# Patient Record
Sex: MALE | Race: BLACK OR AFRICAN AMERICAN | Employment: OTHER | URBAN - METROPOLITAN AREA
[De-identification: names, ages, dates, MRNs, and addresses within clinical notes are randomized per-mention and may not be internally consistent; named-entity substitution may affect disease eponyms.]

---

## 2017-01-25 RX ORDER — EZETIMIBE 10 MG/1
10 TABLET ORAL DAILY
Qty: 30 TAB | Refills: 4 | Status: SHIPPED | OUTPATIENT
Start: 2017-01-25 | End: 2017-08-08 | Stop reason: SDUPTHER

## 2017-02-08 ENCOUNTER — OFFICE VISIT (OUTPATIENT)
Dept: FAMILY MEDICINE CLINIC | Age: 62
End: 2017-02-08

## 2017-02-08 VITALS
OXYGEN SATURATION: 94 % | SYSTOLIC BLOOD PRESSURE: 144 MMHG | HEART RATE: 81 BPM | WEIGHT: 294.2 LBS | BODY MASS INDEX: 43.58 KG/M2 | HEIGHT: 69 IN | TEMPERATURE: 97.9 F | RESPIRATION RATE: 18 BRPM | DIASTOLIC BLOOD PRESSURE: 88 MMHG

## 2017-02-08 DIAGNOSIS — E66.01 MORBID OBESITY, UNSPECIFIED OBESITY TYPE (HCC): ICD-10-CM

## 2017-02-08 DIAGNOSIS — E11.9 DIABETES MELLITUS WITHOUT COMPLICATION (HCC): Primary | ICD-10-CM

## 2017-02-08 DIAGNOSIS — E78.00 ELEVATED CHOLESTEROL: ICD-10-CM

## 2017-02-08 DIAGNOSIS — I10 ESSENTIAL HYPERTENSION: ICD-10-CM

## 2017-02-08 DIAGNOSIS — K21.9 GASTROESOPHAGEAL REFLUX DISEASE WITHOUT ESOPHAGITIS: ICD-10-CM

## 2017-02-08 NOTE — PROGRESS NOTES
Chief Complaint   Patient presents with    Diabetes    Cholesterol Problem     Zetia    Hypertension     Norvasc     Follow up  -Fasting   -Blood obtained in office

## 2017-02-08 NOTE — LETTER
2/22/2017 8:27 AM 
 
Mr. Fili Shepherd [id number] Dear Olimpia Ley Sr.: 
 
Please find your most recent results below. Resulted Orders METABOLIC PANEL, COMPREHENSIVE Result Value Ref Range Glucose 95 65 - 99 mg/dL BUN 11 8 - 27 mg/dL Creatinine 0.71 (L) 0.76 - 1.27 mg/dL GFR est non- >59 mL/min/1.73 GFR est  >59 mL/min/1.73  
 BUN/Creatinine ratio 15 10 - 22 Sodium 142 134 - 144 mmol/L Potassium 4.1 3.5 - 5.2 mmol/L Chloride 97 96 - 106 mmol/L  
 CO2 32 (H) 18 - 29 mmol/L Calcium 9.3 8.6 - 10.2 mg/dL Protein, total 7.2 6.0 - 8.5 g/dL Albumin 4.4 3.6 - 4.8 g/dL GLOBULIN, TOTAL 2.8 1.5 - 4.5 g/dL A-G Ratio 1.6 1.1 - 2.5 Bilirubin, total 0.5 0.0 - 1.2 mg/dL Alk. phosphatase 88 39 - 117 IU/L  
 AST (SGOT) 27 0 - 40 IU/L  
 ALT (SGPT) 26 0 - 44 IU/L Narrative Performed at:  [address]  [id number] : Mio Ross MD, Phone:  [phone] LIPID PANEL Result Value Ref Range Cholesterol, total 212 (H) 100 - 199 mg/dL Triglyceride 120 0 - 149 mg/dL HDL Cholesterol 60 >39 mg/dL VLDL, calculated 24 5 - 40 mg/dL LDL, calculated 128 (H) 0 - 99 mg/dL Narrative Performed at:  [address]  [id number] : Mio Ross MD, Phone:  [phone] HEMOGLOBIN A1C WITH EAG Result Value Ref Range Hemoglobin A1c 6.4 (H) 4.8 - 5.6 % Comment:  
            Pre-diabetes: 5.7 - 6.4 Diabetes: >6.4 Glycemic control for adults with diabetes: <7.0 Estimated average glucose 137 mg/dL Narrative Performed at:  [address]  [id number] : Mio Ross MD, Phone:  [phone] MICROALBUMIN, UR, RAND W/ MICROALBUMIN/CREA RATIO Result Value Ref Range Creatinine, urine 151.7 Not Estab. mg/dL Microalbumin, urine 38.2 Not Estab. ug/mL Microalb/Creat ratio (ug/mg creat.) 25.2 0.0 - 30.0 mg/g creat Narrative Performed at:  [address]  [id number] : Lisa Godwin MD, Phone:  [phone] CVD REPORT Result Value Ref Range INTERPRETATION Note Comment:  
   Supplement report is available. Narrative Performed at:  [facility] [place] [address]  [id number] : Vadim Nava PhD, Phone:  [phone] DIABETES PATIENT EDUCATION Result Value Ref Range PDF Image Not applicable Narrative Performed at:  [address] [place] [address]  [id number] : Vadim Nava PhD, Phone:  [phone] RECOMMENDATIONS: Stick to strict diabetic diet , exercise regularly and if on medications be compliant with your medications. Keep your follow-up appointment. Please call me if you have any questions: [phone] Sincerely, Dale Brantley MD

## 2017-02-08 NOTE — PROGRESS NOTES
HISTORY OF PRESENT ILLNESS  Rhonda Franklin is a 57 y.o. male. Diabetes   The history is provided by the patient. This is a chronic problem. Progression since onset: DM has been undr good control . Last Hba1C was 6.4 . Compliant with medications . Cholesterol Problem   The history is provided by the patient. This is a chronic problem. Progression since onset: Intolerant to statins and he is on Zetia . The profile is reasonable with mild elevation of LDL . Hypertension   The history is provided by the patient. This is a chronic problem. Progression since onset: BP has been marginal . No symptoms . Compliant with meds . No adverse reactions. GERD   The history is provided by the patient. This is a chronic problem. Progression since onset: No symptoms and he is off any medication . Weight Management   The history is provided by the patient. This is a chronic problem. Progression since onset: He is trying to lose weight and he does walking  every day        Review of Systems   Constitutional: Positive for weight gain. HENT: Negative. Eyes: Negative. Respiratory: Negative. Cardiovascular: Negative. Gastrointestinal: Negative. Genitourinary: Negative. Musculoskeletal: Negative. Skin: Negative. Neurological: Negative. Endo/Heme/Allergies: Negative. Psychiatric/Behavioral: Negative. Physical Exam  General:   Head: Alert, cooperative, no distress, appears stated age. Normocephalic and atraumatic   Eyes:  Conjunctivae/corneas clear. PERRL, EOMs intact. Ears:  Normal TMs and external ear canals both ears. Nose: Nares normal. Septum midline. Mucosa normal. No drainage or sinus tenderness. Mouth:  Throat: Lips, mucosa, and tongue normal. Teeth and gums normal.  No lesions or exudates. Neck: Supple, symmetrical, trachea midline, no adenopathy, thyroid: no enlargement/tenderness/nodules. Back:   Symmetric, no curvature. ROM normal. No CVA tenderness. Lungs:   Clear to auscultation bilaterally. Heart:  Regular rate and rhythm, S1, S2 normal, no murmur, click, rub or gallop. Abdomen:   Soft, non-tender. Bowel sounds normal. No masses,  No organomegaly. Extremities: Extremities normal, atraumatic, no cyanosis or edema. Pulses: 2+ and symmetric all extremities. Skin: Skin color, texture, turgor normal. No rashes or lesions   Lymph nodes: Cervical & supraclavicular nodes normal.   Neurologic: CNII-XII intact. Normal strength, sensation and reflexes throughout. Psych:                      Normal mood and affect     ASSESSMENT and PLAN  Encounter Diagnoses   Name Primary?  Diabetes mellitus without complication (HCC) Yes    Essential hypertension     Elevated cholesterol     Gastroesophageal reflux disease without esophagitis     Morbid obesity, unspecified obesity type ([address]      Orders Placed This Encounter    METABOLIC PANEL, COMPREHENSIVE    LIPID PANEL    HEMOGLOBIN A1C WITH EAG    MICROALBUMIN, UR, RAND W/ MICROALBUMIN/CREA RATIO   Follow-up Disposition:  Return in about 4 months (around 6/8/2017) for Follow-up, Fasting, DM, HTN, CHOL. Reviewed and discussed previous lab results. Results of labs requested today will be notified when available. He was advised to continue with current medications. He was given an after visit summary which includes diagnoses, vital signs, current medications, &  authorized prescriptions. Patient verbalized understanding.

## 2017-02-08 NOTE — PATIENT INSTRUCTIONS

## 2017-02-08 NOTE — MR AVS SNAPSHOT
Visit Information Date & Time Provider Department Dept. Phone Encounter #  
 2/8/2017  9:00 AM Vanessa Valentino MD Hieu Tor[facility] [id number] Follow-up Instructions Return in about 4 months (around 6/8/2017) for Follow-up, Fasting, DM, HTN, CHOL. Upcoming Health Maintenance Date Due Pneumococcal 19-64 Medium Risk (1 of 1 - PPSV23) 5/7/1973 FOBT Q 1 YEAR AGE 50-75 5/7/2004 INFLUENZA AGE 9 TO ADULT 8/1/2016 HEMOGLOBIN A1C Q6M 3/7/2017 EYE EXAM RETINAL OR DILATED Q1 4/27/2017 FOOT EXAM Q1 9/7/2017 MICROALBUMIN Q1 9/7/2017 LIPID PANEL Q1 9/7/2017 DTaP/Tdap/Td series (2 - Td) 9/7/2026 Allergies as of 2/8/2017  Review Complete On: 2/8/2017 By: Vanessa Valentino MD  
 No Known Allergies Current Immunizations  Reviewed on 9/7/2016 Name Date Influenza Vaccine 11/18/2014  9:30 AM  
 Tdap 9/7/2016 10:20 AM  
  
 Not reviewed this visit You Were Diagnosed With   
  
 Codes Comments Diabetes mellitus without complication ([facility]    -  Primary ICD-10-CM: E11.9 ICD-9-CM: 250.00 Essential hypertension     ICD-10-CM: I10 
ICD-9-CM: 401.9 Elevated cholesterol     ICD-10-CM: E78.00 ICD-9-CM: 272.0 Gastroesophageal reflux disease without esophagitis     ICD-10-CM: K21.9 ICD-9-CM: 530.81 Morbid obesity, unspecified obesity type ([facility]     ICD-10-CM: E66.01 
ICD-9-CM: 278.01 Vitals BP Pulse Temp Resp Height(growth percentile) Weight(growth percentile) 144/88 (BP 1 Location: Right arm, BP Patient Position: Sitting) 81 97.9 °F (36.6 °C) (Oral) 18 5' 9\" (1.753 m) 294 lb 3.2 oz (133.4 kg) SpO2 BMI Smoking Status 94% 43.45 kg/m2 Former Smoker Vitals History BMI and BSA Data Body Mass Index Body Surface Area  
 43.45 kg/m 2 2.55 m 2 Preferred Pharmacy Pharmacy Name Phone [place] [address] [phone] Your Updated Medication List  
  
   
This list is accurate as of: 2/8/17 10:06 AM.  Always use your most recent med list. amLODIPine 5 mg tablet Commonly known as:  Scooby Loera Take 1 Tab by mouth daily. aspirin delayed-release 81 mg tablet Take 81 mg by mouth daily. atenolol 25 mg tablet Commonly known as:  TENORMIN Take 1 tablet by mouth daily. ezetimibe 10 mg tablet Commonly known as:  Luis Antonio Duane Take 1 Tab by mouth daily. losartan-hydroCHLOROthiazide 100-25 mg per tablet Commonly known as:  HYZAAR Take 1 Tab by mouth daily. multivitamin tablet Commonly known as:  ONE A DAY Take 1 tablet by mouth daily. ZyrTEC 10 mg tablet Generic drug:  cetirizine Take 10 mg by mouth daily. We Performed the Following HEMOGLOBIN A1C WITH EAG [64342 CPT(R)] LIPID PANEL [98555 CPT(R)] METABOLIC PANEL, COMPREHENSIVE [59138 CPT(R)] MICROALBUMIN, UR, RAND W/ MICROALBUMIN/CREA RATIO [id number] CPT(R)] [place] COLLECTION VENOUS BLOOD,VENIPUNCTURE A7912330 CPT(R)] OH HANDLG&/OR CONVEY OF SPEC FOR TR OFFICE TO LAB [36044 CPT(R)] Follow-up Instructions Return in about 4 months (around 6/8/2017) for Follow-up, Fasting, DM, HTN, CHOL. Patient Instructions Learning About Diabetes Food Guidelines Your Care Instructions Meal planning is important to manage diabetes. It helps keep your blood sugar at a target level (which you set with your doctor). You don't have to eat special foods. You can eat what your family eats, including sweets once in a while. But you do have to pay attention to how often you eat and how much you eat of certain foods. You may want to work with a dietitian or a certified diabetes educator (CDE) to help you plan meals and snacks. A dietitian or CDE can also help you lose weight if that is one of your goals. What should you know about eating carbs? Managing the amount of carbohydrate (carbs) you eat is an important part of healthy meals when you have diabetes. Carbohydrate is found in many foods. · Learn which foods have carbs. And learn the amounts of carbs in different foods. ¨ Bread, cereal, pasta, and rice have about 15 grams of carbs in a serving. A serving is 1 slice of bread (1 ounce), ½ cup of cooked cereal, or 1/3 cup of cooked pasta or rice. ¨ Fruits have 15 grams of carbs in a serving. A serving is 1 small fresh fruit, such as an apple or orange; ½ of a banana; ½ cup of cooked or canned fruit; ½ cup of fruit juice; 1 cup of melon or raspberries; or 2 tablespoons of dried fruit. ¨ Milk and no-sugar-added yogurt have 15 grams of carbs in a serving. A serving is 1 cup of milk or 2/3 cup of no-sugar-added yogurt. ¨ Starchy vegetables have 15 grams of carbs in a serving. A serving is ½ cup of mashed potatoes or sweet potato; 1 cup winter squash; ½ of a small baked potato; ½ cup of cooked beans; or ½ cup cooked corn or green peas. · Learn how much carbs to eat each day and at each meal. A dietitian or CDE can teach you how to keep track of the amount of carbs you eat. This is called carbohydrate counting. · If you are not sure how to count carbohydrate grams, use the Plate Method to plan meals. It is a good, quick way to make sure that you have a balanced meal. It also helps you spread carbs throughout the day. ¨ Divide your plate by types of foods. Put non-starchy vegetables on half the plate, meat or other protein food on one-quarter of the plate, and a grain or starchy vegetable in the final quarter of the plate. To this you can add a small piece of fruit and 1 cup of milk or yogurt, depending on how many carbs you are supposed to eat at a meal. 
· Try to eat about the same amount of carbs at each meal. Do not \"save up\" your daily allowance of carbs to eat at one meal. 
· Proteins have very little or no carbs per serving.  Examples of proteins are beef, chicken, turkey, fish, eggs, tofu, cheese, cottage cheese, and peanut butter. A serving size of meat is 3 ounces, which is about the size of a deck of cards. Examples of meat substitute serving sizes (equal to 1 ounce of meat) are 1/4 cup of cottage cheese, 1 egg, 1 tablespoon of peanut butter, and ½ cup of tofu. How can you eat out and still eat healthy? · Learn to estimate the serving sizes of foods that have carbohydrate. If you measure food at home, it will be easier to estimate the amount in a serving of restaurant food. · If the meal you order has too much carbohydrate (such as potatoes, corn, or baked beans), ask to have a low-carbohydrate food instead. Ask for a salad or green vegetables. · If you use insulin, check your blood sugar before and after eating out to help you plan how much to eat in the future. · If you eat more carbohydrate at a meal than you had planned, take a walk or do other exercise. This will help lower your blood sugar. What else should you know? · Limit saturated fat, such as the fat from meat and dairy products. This is a healthy choice because people who have diabetes are at higher risk of heart disease. So choose lean cuts of meat and nonfat or low-fat dairy products. Use olive or canola oil instead of butter or shortening when cooking. · Don't skip meals. Your blood sugar may drop too low if you skip meals and take insulin or certain medicines for diabetes. · Check with your doctor before you drink alcohol. Alcohol can cause your blood sugar to drop too low. Alcohol can also cause a bad reaction if you take certain diabetes medicines. Follow-up care is a key part of your treatment and safety. Be sure to make and go to all appointments, and call your doctor if you are having problems. It's also a good idea to know your test results and keep a list of the medicines you take. Where can you learn more? Go to http://luis-hua.info/. Enter M247 in the search box to learn more about \"Learning About Diabetes Food Guidelines. \" Current as of: May 23, 2016 Content Version: 11.1 © 3476-9642 [employer]. Care instructions adapted under license by [employer] (which disclaims liability or warranty for this information). If you have questions about a medical condition or this instruction, always ask your healthcare professional. [address] any warranty or liability for your use of this information. Introducing [facility]! Ignacio Brush introduces PiniOn patient portal. Now you can access parts of your medical record, email your doctor's office, and request medication refills online. 1. In your internet browser, go to [email or web address] "[email or web address]"/[email or web address] 2. Click on the First Time User? Click Here link in the Sign In box. You will see the New Member Sign Up page. 3. Enter your PiniOn Access Code exactly as it appears below. You will not need to use this code after youve completed the sign-up process. If you do not sign up before the expiration date, you must request a new code. · PiniOn Access Code: [email or web address] Expires: 5/9/2017  9:01 AM 
 
4. Enter the last four digits of your Social Security Number (xxxx) and Date of Birth (mm/dd/yyyy) as indicated and click Submit. You will be taken to the next sign-up page. 5. Create a PiniOn ID. This will be your PiniOn login ID and cannot be changed, so think of one that is secure and easy to remember. 6. Create a PiniOn password. You can change your password at any time. 7. Enter your Password Reset Question and Answer. This can be used at a later time if you forget your password. 8. Enter your e-mail address. You will receive e-mail notification when new information is available in [address] 9. Click Sign Up. You can now view and download portions of your medical record. 10. Click the Download Summary menu link to download a portable copy of your medical information. If you have questions, please visit the Frequently Asked Questions section of the [employer] website. Remember, [employer] is NOT to be used for urgent needs. For medical emergencies, dial 911. Now available from your iPhone and Android! Please provide this summary of care documentation to your next provider. Your primary care clinician is listed as Tarsha Gallardo. If you have any questions after today's visit, please call [phone].

## 2017-02-09 LAB
ALBUMIN SERPL-MCNC: 4.4 G/DL (ref 3.6–4.8)
ALBUMIN/CREAT UR: 25.2 MG/G CREAT (ref 0–30)
ALBUMIN/GLOB SERPL: 1.6 {RATIO} (ref 1.1–2.5)
ALP SERPL-CCNC: 88 IU/L (ref 39–117)
ALT SERPL-CCNC: 26 IU/L (ref 0–44)
AST SERPL-CCNC: 27 IU/L (ref 0–40)
BILIRUB SERPL-MCNC: 0.5 MG/DL (ref 0–1.2)
BUN SERPL-MCNC: 11 MG/DL (ref 8–27)
BUN/CREAT SERPL: 15 (ref 10–22)
CALCIUM SERPL-MCNC: 9.3 MG/DL (ref 8.6–10.2)
CHLORIDE SERPL-SCNC: 97 MMOL/L (ref 96–106)
CHOLEST SERPL-MCNC: 212 MG/DL (ref 100–199)
CO2 SERPL-SCNC: 32 MMOL/L (ref 18–29)
CREAT SERPL-MCNC: 0.71 MG/DL (ref 0.76–1.27)
CREAT UR-MCNC: 151.7 MG/DL
EST. AVERAGE GLUCOSE BLD GHB EST-MCNC: 137 MG/DL
GLOBULIN SER CALC-MCNC: 2.8 G/DL (ref 1.5–4.5)
GLUCOSE SERPL-MCNC: 95 MG/DL (ref 65–99)
HBA1C MFR BLD: 6.4 % (ref 4.8–5.6)
HDLC SERPL-MCNC: 60 MG/DL
INTERPRETATION, 910389: NORMAL
LDLC SERPL CALC-MCNC: 128 MG/DL (ref 0–99)
Lab: NORMAL
MICROALBUMIN UR-MCNC: 38.2 UG/ML
POTASSIUM SERPL-SCNC: 4.1 MMOL/L (ref 3.5–5.2)
PROT SERPL-MCNC: 7.2 G/DL (ref 6–8.5)
SODIUM SERPL-SCNC: 142 MMOL/L (ref 134–144)
TRIGL SERPL-MCNC: 120 MG/DL (ref 0–149)
VLDLC SERPL CALC-MCNC: 24 MG/DL (ref 5–40)

## 2017-02-17 RX ORDER — AMLODIPINE BESYLATE 5 MG/1
5 TABLET ORAL DAILY
Qty: 30 TAB | Refills: 4 | Status: SHIPPED | OUTPATIENT
Start: 2017-02-17 | End: 2017-08-08 | Stop reason: SDUPTHER

## 2017-02-17 RX ORDER — LOSARTAN POTASSIUM AND HYDROCHLOROTHIAZIDE 25; 100 MG/1; MG/1
1 TABLET ORAL DAILY
Qty: 30 TAB | Refills: 4 | Status: SHIPPED | OUTPATIENT
Start: 2017-02-17 | End: 2017-08-08 | Stop reason: SDUPTHER

## 2017-02-22 NOTE — PROGRESS NOTES
Comprehensive Metabolic Panel :Blood sugar normal , liver and kidneys OK  Hemoglobin A1C : 6.4 Diabetes under control   Microalb/Creat ratio:  Normal  Cholesterol : Elevated. Adv:   Stick to strict diabetic diet , exercise regularly and if on medications be compliant with your medications. Keep your follow-up appointment.

## 2017-02-22 NOTE — PROGRESS NOTES
Letter sent to Roma Ley Sr. in regards to lab results. No call made at this time.        Ye Martinez

## 2017-08-08 ENCOUNTER — OFFICE VISIT (OUTPATIENT)
Dept: INTERNAL MEDICINE CLINIC | Age: 62
End: 2017-08-08

## 2017-08-08 VITALS
BODY MASS INDEX: 45.18 KG/M2 | WEIGHT: 305 LBS | TEMPERATURE: 97.3 F | HEART RATE: 73 BPM | SYSTOLIC BLOOD PRESSURE: 182 MMHG | OXYGEN SATURATION: 96 % | DIASTOLIC BLOOD PRESSURE: 92 MMHG | HEIGHT: 69 IN

## 2017-08-08 DIAGNOSIS — I10 ESSENTIAL HYPERTENSION: Primary | ICD-10-CM

## 2017-08-08 DIAGNOSIS — E78.00 ELEVATED CHOLESTEROL: ICD-10-CM

## 2017-08-08 DIAGNOSIS — E11.9 DIABETES MELLITUS WITHOUT COMPLICATION (HCC): ICD-10-CM

## 2017-08-08 DIAGNOSIS — Z00.00 ROUTINE GENERAL MEDICAL EXAMINATION AT A HEALTH CARE FACILITY: ICD-10-CM

## 2017-08-08 DIAGNOSIS — R01.1 SYSTOLIC MURMUR: ICD-10-CM

## 2017-08-08 RX ORDER — LOSARTAN POTASSIUM AND HYDROCHLOROTHIAZIDE 25; 100 MG/1; MG/1
1 TABLET ORAL DAILY
Qty: 30 TAB | Refills: 5 | Status: SHIPPED | OUTPATIENT
Start: 2017-08-08 | End: 2018-02-05 | Stop reason: SDUPTHER

## 2017-08-08 RX ORDER — AMLODIPINE BESYLATE 5 MG/1
5 TABLET ORAL DAILY
Qty: 30 TAB | Refills: 5 | Status: SHIPPED | OUTPATIENT
Start: 2017-08-08 | End: 2018-02-05 | Stop reason: SDUPTHER

## 2017-08-08 RX ORDER — EZETIMIBE 10 MG/1
10 TABLET ORAL DAILY
Qty: 30 TAB | Refills: 5 | Status: SHIPPED | OUTPATIENT
Start: 2017-08-08 | End: 2018-02-05 | Stop reason: SDUPTHER

## 2017-08-08 NOTE — MR AVS SNAPSHOT
Visit Information Date & Time Provider Department Dept. Phone Encounter #  
 8/8/2017  4:00 PM [facility] [phone] Follow-up Instructions Return in about 6 months (around 2/8/2018) for HTN. Upcoming Health Maintenance Date Due Pneumococcal 19-64 Medium Risk (1 of 1 - PPSV23) 5/7/1973 FOBT Q 1 YEAR AGE 50-75 5/7/2004 EYE EXAM RETINAL OR DILATED Q1 4/27/2017 INFLUENZA AGE 9 TO ADULT 8/1/2017 HEMOGLOBIN A1C Q6M 8/8/2017 FOOT EXAM Q1 9/7/2017 MICROALBUMIN Q1 2/8/2018 LIPID PANEL Q1 2/8/2018 DTaP/Tdap/Td series (2 - Td) 9/7/2026 Allergies as of 8/8/2017  Review Complete On: 8/8/2017 By: Evangelista Henriquez No Known Allergies Current Immunizations  Reviewed on 8/8/2017 Name Date Influenza Vaccine 11/18/2014  9:30 AM  
 Tdap 9/7/2016 10:20 AM  
  
 Reviewed by Jessica Lopez NP on 8/8/2017 at  4:18 PM  
You Were Diagnosed With   
  
 Codes Comments Essential hypertension    -  Primary ICD-10-CM: I10 
ICD-9-CM: 401.9 Elevated cholesterol     ICD-10-CM: E78.00 ICD-9-CM: 272.0 Diabetes mellitus without complication ([facility] 75.)     QCA-51-CU: E11.9 ICD-9-CM: 250.00 Routine general medical examination at a health care facility     ICD-10-CM: Z00.00 ICD-9-CM: V70.0 Systolic murmur     ECM-18-EF: R01.1 ICD-9-CM: 488. 2 Vitals BP Pulse Temp Height(growth percentile) Weight(growth percentile) SpO2  
 (!) 190/100 (BP 1 Location: Right arm, BP Patient Position: Sitting) 73 97.3 °F (36.3 °C) (Oral) 5' 9\" (1.753 m) 305 lb (138.3 kg) 96% BMI Smoking Status 45.04 kg/m2 Former Smoker Vitals History BMI and BSA Data Body Mass Index Body Surface Area 45.04 kg/m 2 2.59 m 2 Preferred Pharmacy Pharmacy Name Phone Lyssa Nguyen [phone] Your Updated Medication List  
  
   
 This list is accurate as of: 17  4:39 PM.  Always use your most recent med list. amLODIPine 5 mg tablet Commonly known as:  Isabel Rita Take 1 Tab by mouth daily. Indications: hypertension  
  
 aspirin delayed-release 81 mg tablet Take 81 mg by mouth daily. atenolol 25 mg tablet Commonly known as:  TENORMIN Take 1 tablet by mouth daily. ezetimibe 10 mg tablet Commonly known as:  Candida Boeck Take 1 Tab by mouth daily. losartan-hydroCHLOROthiazide 100-25 mg per tablet Commonly known as:  HYZAAR Take 1 Tab by mouth daily. Indications: hypertension  
  
 multivitamin tablet Commonly known as:  ONE A DAY Take 1 tablet by mouth daily. pneumococcal 13 key conj dip 0.5 mL Syrg injection Commonly known as:  PREVNAR-13  
0.5 mL by IntraMUSCular route once for 1 dose. Indications: PREVENTION OF STREPTOCOCCUS PNEUMONIAE INFECTION ZyrTEC 10 mg tablet Generic drug:  cetirizine Take 10 mg by mouth daily. Prescriptions Printed Refills  
 pneumococcal 13 key conj dip (PREVNAR-13) 0.5 mL syrg injection 0 Si.5 mL by IntraMUSCular route once for 1 dose. Indications: PREVENTION OF STREPTOCOCCUS PNEUMONIAE INFECTION Class: Print Route: IntraMUSCular Prescriptions Sent to Pharmacy Refills  
 amLODIPine (NORVASC) 5 mg tablet 5 Sig: Take 1 Tab by mouth daily. Indications: hypertension Class: Normal  
 Pharmacy: [place], [address] Ph #: [phone] Route: Oral  
 losartan-hydroCHLOROthiazide (HYZAAR) 100-25 mg per tablet 5 Sig: Take 1 Tab by mouth daily. Indications: hypertension Class: Normal  
 Pharmacy: [place], [address] Ph #: [phone] Route: Oral  
 ezetimibe (ZETIA) 10 mg tablet 5 Sig: Take 1 Tab by mouth daily.   
 Class: Normal  
 Pharmacy: [place] [id number]  #: [phone] Route: Oral  
  
We Performed the Following CBC WITH AUTOMATED DIFF [68435 CPT(R)] HEMOGLOBIN A1C WITH EAG [89063 CPT(R)] LIPID PANEL [88235 CPT(R)] METABOLIC PANEL, COMPREHENSIVE [99374 CPT(R)] REFERRAL FOR COLONOSCOPY [[id number] Custom] Comments:  
 Please evaluate patient for colonoscopy. Follow-up Instructions Return in about 6 months (around 2/8/2018) for HTN. To-Do List   
 Around 08/16/2017 ECHO:  2D ECHO COMPLETE ADULT (TTE) W OR WO CONTR Referral Information Referral ID Referred By Referred To  
  
 [id number] [facility], [address] [facility]   
   [address] [facility] [address] [place], [address] Visits Status Start Date End Date 1 New Request 8/8/17 8/8/18 If your referral has a status of pending review or denied, additional information will be sent to support the outcome of this decision. Patient Instructions 1. Please go to a [employer] (any one) and give them your lab paperwork I have provided you with. You will need be have nothing to eat or drink (besides water or black coffee) for 8 hours prior to blood draw 2. I have ordered an Echocardiogram of your heart to reassess your heart function. You should hear from schedulers within 24 hours, if you do not hear from them, please call and schedule this. 3. I have ordered a colonoscopy to be done. Please call and schedule this You are scheduled for a colonoscopy. This procedure allows the specialist to examine your colon (large intestine) by inserting a lighted tube through your rectum to see the inner lining. Polyps (small tissue growths that can turn into cancer) can be seen and removed during this procedure. In order to prepare for this exam, you will be given instructions for preparing your colon to be examined.  It is very important that your follow these instructions carefully to ensure a good examination can be accomplished. There are certain medications that need to be stopped prior to this examination. If you take Aspirin, Coumadin (warfarin), Eliquis, enoxaparin, heparin, Persantine (dipyridamole), Plavix, Pradaxa, Xarelto or any \"blood thinner\" you may need to stop taking it for several days to a week prior to your examination. Make sure to let both myself and the specialist doing the procedure this information. You will typically be given a sedative prior to the procedure, so you will need someone to take you home after the procedure as you won't be allowed to drive. Depending on what is or isn't found during your procedure, you will be given instructions on when you should have your next procedure performed. This will typically be ten years if no polyps are found and you don't have a worrisome family history or a previous history of cancer or polyps. If you have any questions about the procedure, please don't hesitate to ask. Introducing [facility] SERVICES! Lorrie Thomas introduces [facility] patient portal. Now you can access parts of your medical record, email your doctor's office, and request medication refills online. 1. In your internet browser, go to [email or web address] 2. Click on the First Time User? Click Here link in the Sign In box. You will see the New Member Sign Up page. 3. Enter your [facility] Access Code exactly as it appears below. You will not need to use this code after youve completed the sign-up process. If you do not sign up before the expiration date, you must request a new code. · [facility] Access Code: [email or web address] Expires: 11/6/2017  4:39 PM 
 
4. Enter the last four digits of your Social Security Number (xxxx) and Date of Birth (mm/dd/yyyy) as indicated and click Submit. You will be taken to the next sign-up page. 5. Create a [facility] ID. This will be your [facility] login ID and cannot be changed, so think of one that is secure and easy to remember. 6. Create a [employer] password. You can change your password at any time. 7. Enter your Password Reset Question and Answer. This can be used at a later time if you forget your password. 8. Enter your e-mail address. You will receive e-mail notification when new information is available in [address] 9. Click Sign Up. You can now view and download portions of your medical record. 10. Click the Download Summary menu link to download a portable copy of your medical information. If you have questions, please visit the Frequently Asked Questions section of the [employer] website. Remember, [employer] is NOT to be used for urgent needs. For medical emergencies, dial 911. Now available from your iPhone and Android! Please provide this summary of care documentation to your next provider. Your primary care clinician is listed as Kun Lu. If you have any questions after today's visit, please call [phone].

## 2017-08-08 NOTE — PATIENT INSTRUCTIONS
1.  Please go to a [employer] (any one) and give them your lab paperwork I have provided you with. You will need be have nothing to eat or drink (besides water or black coffee) for 8 hours prior to blood draw    2. I have ordered an Echocardiogram of your heart to reassess your heart function. You should hear from schedulers within 24 hours, if you do not hear from them, please call and schedule this. 3. I have ordered a colonoscopy to be done. Please call and schedule this      You are scheduled for a colonoscopy. This procedure allows the specialist to examine your colon (large intestine) by inserting a lighted tube through your rectum to see the inner lining. Polyps (small tissue growths that can turn into cancer) can be seen and removed during this procedure. In order to prepare for this exam, you will be given instructions for preparing your colon to be examined. It is very important that your follow these instructions carefully to ensure a good examination can be accomplished. There are certain medications that need to be stopped prior to this examination. If you take Aspirin, Coumadin (warfarin), Eliquis, enoxaparin, heparin, Persantine (dipyridamole), Plavix, Pradaxa, Xarelto or any \"blood thinner\" you may need to stop taking it for several days to a week prior to your examination. Make sure to let both myself and the specialist doing the procedure this information. You will typically be given a sedative prior to the procedure, so you will need someone to take you home after the procedure as you won't be allowed to drive. Depending on what is or isn't found during your procedure, you will be given instructions on when you should have your next procedure performed. This will typically be ten years if no polyps are found and you don't have a worrisome family history or a previous history of cancer or polyps. If you have any questions about the procedure, please don't hesitate to ask.

## 2017-08-08 NOTE — PROGRESS NOTES
Subjective:      Rustam Richardson is a 60 y.o. male who presents today for     DM2:  Last A1C 6.4% in 02/2017. Is not taking medication (no metformin or insulin). Does not check BG. Exercise, diet. Eats lots of protein, eggs for breakfast, turkey, chick, turkey cruz, carrots, salad. Avoids carbs (occasional potatoes), no white bread or white rice. Last eye exam done last week at [facility]. Next exam 10/30/17    Hyperlipidemia:  Takes zetia for this  Last lipid pannel 02/17, LDL elevated    HTN:  Does not do home BP checks  Takes amlodipine 5mg, losartan/hctz 100mg-25mg  Atenolol 25mg daily is on med list, however patient has not been taking, believe his previous PCP stopped this  Ran out of BP medications several days ago (~ 1 week ago). His previous doctors office wouldn't prescribe refills as his physician had left the practice    Weight Management   Trying to lose weight  Walks 3-4 miles on weekends  During the week walks a mile a day    Work with [employer]    Denies any chest pain, palpitaitons, + occassional dyspnea with walking too quickly, no cough, nausea/vomiting, bowel changes, blood in stool, difficulty urinating- no hesitancy or dribbling, syncope, or headaches. Health Maintenance:   Last colonoscopy: never had a colonoscopy  Last PSA:  1 cousin with prostate cancer  Last tetanus vaccination 2016   Last influenza vaccination 2014      Patient Active Problem List    Diagnosis Date Noted    Essential hypertension 11/13/2015    [facility] 62/45/6486    Elevated cholesterol 03/29/2012    Obesity 03/29/2012    GERD (gastroesophageal reflux disease) 03/29/2012     Current Outpatient Prescriptions   Medication Sig Dispense Refill    amLODIPine (NORVASC) 5 mg tablet Take 1 Tab by mouth daily. Indications: hypertension 30 Tab 5    losartan-hydroCHLOROthiazide (HYZAAR) 100-25 mg per tablet Take 1 Tab by mouth daily. Indications: hypertension 30 Tab 5    ezetimibe (ZETIA) 10 mg tablet Take 1 Tab by mouth daily. 30 Tab 5    atenolol (TENORMIN) 25 mg tablet Take 1 tablet by mouth daily. 30 tablet 6    multivitamin (ONE A DAY) tablet Take 1 tablet by mouth daily.  cetirizine (ZYRTEC) 10 mg tablet Take 10 mg by mouth daily.  aspirin delayed-release 81 mg tablet Take 81 mg by mouth daily. Review of Systems    Pertinent items are noted in HPI. Objective:     Visit Vitals    BP (!) 190/100 (BP 1 Location: Right arm, BP Patient Position: Sitting)    Pulse 73    Temp 97.3 °F (36.3 °C) (Oral)    Ht 5' 9\" (1.753 m)    Wt 305 lb (138.3 kg)    SpO2 96%    BMI 45.04 kg/m2     General:  Alert, cooperative, no distress, appears younger than stated age. Head:  Normocephalic, without obvious abnormality, atraumatic. Neck: Supple, symmetrical, trachea midline, no adenopathy, thyroid: no enlargement/tenderness/nodules, no carotid bruit and no JVD. Lungs:   Clear to auscultation bilaterally. Heart:  Regular rate and rhythm, 2/6 systolic murmur, trace edema bilateral LEs   Abdomen:   obese   Extremities: Extremities normal, atraumatic, trace edema bilateral LEs. Pulses: 2+ and symmetric all extremities. Skin: Skin color, texture, turgor normal. No rashes or lesions. Various skin tags around neck, no asymmetrical or suspicious moles or lesions   Neurologic: Grossly normal         Assessment/Plan:     1. Essential hypertension  - uncontrolled today, patient has not taken BP medication in 1 week. - refills sent to the pharmacy, educated patient re not running out of medications, calling if there is a problem, etc  - amLODIPine (NORVASC) 5 mg tablet; Take 1 Tab by mouth daily. Indications: hypertension  Dispense: 30 Tab; Refill: 5  - losartan-hydroCHLOROthiazide (HYZAAR) 100-25 mg per tablet; Take 1 Tab by mouth daily. Indications: hypertension  Dispense: 30 Tab;  Refill: 5  - METABOLIC PANEL, COMPREHENSIVE  - CBC WITH AUTOMATED DIFF    2. Elevated cholesterol  - discussed diet (lots of eggs, etc) is likely too high in cholesterol. Patient aware and will make adjustments  - ezetimibe (ZETIA) 10 mg tablet; Take 1 Tab by mouth daily. Dispense: 30 Tab; Refill: 5  - LIPID PANEL    3. Diabetes mellitus without complication (HCC)  - diet controlled, no current medications  - HEMOGLOBIN A1C WITH EAG    4. Routine general medical examination at a health care facility  - REFERRAL FOR COLONOSCOPY  - pneumococcal 13 key conj dip (PREVNAR-13) 0.5 mL syrg injection; 0.5 mL by IntraMUSCular route once for 1 dose. Indications: PREVENTION OF STREPTOCOCCUS PNEUMONIAE INFECTION  Dispense: 0.5 mL; Refill: 0    5. Systolic murmur  - had Echo 2014 showing \"aortic and mitral sclerosis but no stenosis\" for similar. Patient with dyspnea on exertion and new LE swelling. BP uncontrolled  - will obtain another Echo  - 2D ECHO COMPLETE ADULT (TTE) W OR WO CONTR; Future      Advised him to call back or return to office if symptoms worsen/change/persist.  Discussed expected course/resolution/complications of diagnosis in detail with patient. Medication risks/benefits/costs/interactions/alternatives discussed with patient. He was given an after visit summary which includes diagnoses, current medications, & vitals. He expressed understanding with the diagnosis and plan.     SHEFALI Astorga

## 2017-08-08 NOTE — PROGRESS NOTES
Chief Complaint   Patient presents with   [address] [place] [address]     1. Have you been to the ER, urgent care clinic since your last visit? Hospitalized since your last visit? No    2. Have you seen or consulted any other health care providers outside of the [facility] since your last visit? Include any pap smears or colon screening.  No

## 2017-08-11 DIAGNOSIS — E78.00 ELEVATED CHOLESTEROL: Primary | ICD-10-CM

## 2017-08-11 LAB
ALBUMIN SERPL-MCNC: 4.3 G/DL (ref 3.6–4.8)
ALBUMIN/GLOB SERPL: 1.5 {RATIO} (ref 1.2–2.2)
ALP SERPL-CCNC: 87 IU/L (ref 39–117)
ALT SERPL-CCNC: 24 IU/L (ref 0–44)
AST SERPL-CCNC: 25 IU/L (ref 0–40)
BASOPHILS # BLD AUTO: 0 X10E3/UL (ref 0–0.2)
BASOPHILS NFR BLD AUTO: 0 %
BILIRUB SERPL-MCNC: 0.5 MG/DL (ref 0–1.2)
BUN SERPL-MCNC: 14 MG/DL (ref 8–27)
BUN/CREAT SERPL: 17 (ref 10–24)
CALCIUM SERPL-MCNC: 9.4 MG/DL (ref 8.6–10.2)
CHLORIDE SERPL-SCNC: 97 MMOL/L (ref 96–106)
CHOLEST SERPL-MCNC: 214 MG/DL (ref 100–199)
CO2 SERPL-SCNC: 29 MMOL/L (ref 18–29)
CREAT SERPL-MCNC: 0.82 MG/DL (ref 0.76–1.27)
EOSINOPHIL # BLD AUTO: 0.1 X10E3/UL (ref 0–0.4)
EOSINOPHIL NFR BLD AUTO: 1 %
ERYTHROCYTE [DISTWIDTH] IN BLOOD BY AUTOMATED COUNT: 15.7 % (ref 12.3–15.4)
EST. AVERAGE GLUCOSE BLD GHB EST-MCNC: 143 MG/DL
GLOBULIN SER CALC-MCNC: 2.9 G/DL (ref 1.5–4.5)
GLUCOSE SERPL-MCNC: 105 MG/DL (ref 65–99)
HBA1C MFR BLD: 6.6 % (ref 4.8–5.6)
HCT VFR BLD AUTO: 45 % (ref 37.5–51)
HDLC SERPL-MCNC: 55 MG/DL
HGB BLD-MCNC: 14.5 G/DL (ref 12.6–17.7)
IMM GRANULOCYTES # BLD: 0 X10E3/UL (ref 0–0.1)
IMM GRANULOCYTES NFR BLD: 0 %
INTERPRETATION, 910389: NORMAL
LDLC SERPL CALC-MCNC: 139 MG/DL (ref 0–99)
LYMPHOCYTES # BLD AUTO: 1.4 X10E3/UL (ref 0.7–3.1)
LYMPHOCYTES NFR BLD AUTO: 23 %
Lab: NORMAL
MCH RBC QN AUTO: 26.5 PG (ref 26.6–33)
MCHC RBC AUTO-ENTMCNC: 32.2 G/DL (ref 31.5–35.7)
MCV RBC AUTO: 82 FL (ref 79–97)
MONOCYTES # BLD AUTO: 0.6 X10E3/UL (ref 0.1–0.9)
MONOCYTES NFR BLD AUTO: 10 %
NEUTROPHILS # BLD AUTO: 4 X10E3/UL (ref 1.4–7)
NEUTROPHILS NFR BLD AUTO: 66 %
PLATELET # BLD AUTO: 225 X10E3/UL (ref 150–379)
POTASSIUM SERPL-SCNC: 4 MMOL/L (ref 3.5–5.2)
PROT SERPL-MCNC: 7.2 G/DL (ref 6–8.5)
RBC # BLD AUTO: 5.48 X10E6/UL (ref 4.14–5.8)
SODIUM SERPL-SCNC: 143 MMOL/L (ref 134–144)
TRIGL SERPL-MCNC: 102 MG/DL (ref 0–149)
VLDLC SERPL CALC-MCNC: 20 MG/DL (ref 5–40)
WBC # BLD AUTO: 6 X10E3/UL (ref 3.4–10.8)

## 2017-08-11 RX ORDER — ATORVASTATIN CALCIUM 10 MG/1
10 TABLET, FILM COATED ORAL
Qty: 30 TAB | Refills: 5 | Status: SHIPPED | OUTPATIENT
Start: 2017-08-11 | End: 2018-02-05 | Stop reason: SDUPTHER

## 2017-08-11 NOTE — PROGRESS NOTES
LDL still elevated, above goal.  Has HTN and DM as well. Is taking zetia only currently  Spoke with patient, encouraged working on diet-reducing egg intake/fried foods/fatty foods/red meat. Encouraged increase exercise/activity level  Will add atorvastatin 10mg daily to regimen.     Pt verbalized understanding of results and plan    Francisca Dennis NP

## 2017-12-03 ENCOUNTER — HOSPITAL ENCOUNTER (EMERGENCY)
Age: 62
Discharge: HOME | End: 2017-12-03
Attending: FAMILY MEDICINE

## 2017-12-03 VITALS
HEART RATE: 98 BPM | SYSTOLIC BLOOD PRESSURE: 172 MMHG | DIASTOLIC BLOOD PRESSURE: 86 MMHG | OXYGEN SATURATION: 94 % | HEIGHT: 69 IN | BODY MASS INDEX: 45.77 KG/M2 | WEIGHT: 309 LBS | TEMPERATURE: 98.6 F | RESPIRATION RATE: 18 BRPM

## 2017-12-03 DIAGNOSIS — J06.9 VIRAL URI WITH COUGH: Primary | ICD-10-CM

## 2017-12-03 RX ORDER — ALBUTEROL SULFATE 90 UG/1
2 AEROSOL, METERED RESPIRATORY (INHALATION)
Qty: 1 INHALER | Refills: 0 | Status: SHIPPED | OUTPATIENT
Start: 2017-12-03

## 2017-12-03 RX ORDER — BENZONATATE 200 MG/1
200 CAPSULE ORAL
Qty: 21 CAP | Refills: 0 | Status: SHIPPED | OUTPATIENT
Start: 2017-12-03 | End: 2017-12-10

## 2017-12-03 RX ORDER — IPRATROPIUM BROMIDE AND ALBUTEROL SULFATE 2.5; .5 MG/3ML; MG/3ML
3 SOLUTION RESPIRATORY (INHALATION)
Status: COMPLETED | OUTPATIENT
Start: 2017-12-03 | End: 2017-12-03

## 2017-12-03 RX ORDER — OMEPRAZOLE 40 MG/1
40 CAPSULE, DELAYED RELEASE ORAL DAILY
Qty: 15 CAP | Refills: 0 | Status: SHIPPED | OUTPATIENT
Start: 2017-12-03

## 2017-12-03 RX ORDER — CODEINE PHOSPHATE AND GUAIFENESIN 10; 100 MG/5ML; MG/5ML
5 SOLUTION ORAL
Qty: 120 ML | Refills: 0 | Status: SHIPPED | OUTPATIENT
Start: 2017-12-03 | End: 2017-12-28

## 2017-12-03 RX ORDER — PREDNISONE 10 MG/1
TABLET ORAL
Qty: 21 TAB | Refills: 0 | Status: SHIPPED | OUTPATIENT
Start: 2017-12-03

## 2017-12-03 RX ADMIN — IPRATROPIUM BROMIDE AND ALBUTEROL SULFATE 3 ML: 2.5; .5 SOLUTION RESPIRATORY (INHALATION) at 15:23

## 2017-12-03 NOTE — DISCHARGE INSTRUCTIONS
Upper Respiratory Infection (Cold): Care Instructions  Your Care Instructions    An upper respiratory infection, or URI, is an infection of the nose, sinuses, or throat. URIs are spread by coughs, sneezes, and direct contact. The common cold is the most frequent kind of URI. The flu and sinus infections are other kinds of URIs. Almost all URIs are caused by viruses. Antibiotics won't cure them. But you can treat most infections with home care. This may include drinking lots of fluids and taking over-the-counter pain medicine. You will probably feel better in 4 to 10 days. The doctor has checked you carefully, but problems can develop later. If you notice any problems or new symptoms, get medical treatment right away. Follow-up care is a key part of your treatment and safety. Be sure to make and go to all appointments, and call your doctor if you are having problems. It's also a good idea to know your test results and keep a list of the medicines you take. How can you care for yourself at home? · To prevent dehydration, drink plenty of fluids, enough so that your urine is light yellow or clear like water. Choose water and other caffeine-free clear liquids until you feel better. If you have kidney, heart, or liver disease and have to limit fluids, talk with your doctor before you increase the amount of fluids you drink. · Take an over-the-counter pain medicine, such as acetaminophen (Tylenol), ibuprofen (Advil, Motrin), or naproxen (Aleve). Read and follow all instructions on the label. · Before you use cough and cold medicines, check the label. These medicines may not be safe for young children or for people with certain health problems. · Be careful when taking over-the-counter cold or flu medicines and Tylenol at the same time. Many of these medicines have acetaminophen, which is Tylenol. Read the labels to make sure that you are not taking more than the recommended dose.  Too much acetaminophen (Tylenol) can be harmful. · Get plenty of rest.  · Do not smoke or allow others to smoke around you. If you need help quitting, talk to your doctor about stop-smoking programs and medicines. These can increase your chances of quitting for good. When should you call for help? Call 911 anytime you think you may need emergency care. For example, call if:  ? · You have severe trouble breathing. ?Call your doctor now or seek immediate medical care if:  ? · You seem to be getting much sicker. ? · You have new or worse trouble breathing. ? · You have a new or higher fever. ? · You have a new rash. ? Watch closely for changes in your health, and be sure to contact your doctor if:  ? · You have a new symptom, such as a sore throat, an earache, or sinus pain. ? · You cough more deeply or more often, especially if you notice more mucus or a change in the color of your mucus. ? · You do not get better as expected. Where can you learn more? Go to http://luis-hua.info/. Enter T282 in the search box to learn more about \"Upper Respiratory Infection (Cold): Care Instructions. \"  Current as of: May 12, 2017  Content Version: 11.4  © 4203-6366 [employer]. Care instructions adapted under license by [employer] (which disclaims liability or warranty for this information). If you have questions about a medical condition or this instruction, always ask your healthcare professional. [place] [address] any warranty or liability for your use of this information.

## 2017-12-03 NOTE — UC PROVIDER NOTE
Patient is a 60 y.o. male presenting with cough. The history is provided by the patient. Cough   This is a new problem. The current episode started more than 2 days ago. The problem occurs every few minutes. The problem has been rapidly worsening. The cough is non-productive. There has been no fever. Associated symptoms include chest pain (on coughing ) and wheezing. Pertinent negatives include no chills, no eye redness, no headaches, no rhinorrhea, no sore throat, no shortness of breath, no nausea and no vomiting. He has tried nothing for the symptoms. Risk factors include travel to endemic areas. His past medical history is significant for bronchitis. His past medical history does not include pneumonia or asthma. Past Medical History:   Diagnosis Date    GERD (gastroesophageal reflux disease)     Hypercholesterolemia     Hypertension     Obesity         Past Surgical History:   Procedure Laterality Date    [place] REPAIR PARAESOPHAGEAL HIATAL HERNIA,LAPAROTOMY, W MESH           Family History   Problem Relation Age of Onset    Hypertension Mother     Stroke Father     Heart Disease Maternal Grandmother     Stroke Maternal Grandfather     Heart Disease Paternal Grandmother         Social History     Social History    Marital status:      Spouse name: N/A    Number of children: N/A    Years of education: N/A     Occupational History    Not on file. Social History Main Topics    Smoking status: Former Smoker     Quit date: 1/25/1982    Smokeless tobacco: Never Used    Alcohol use 1.2 - 1.8 oz/week     2 - 3 Cans of beer per week    Drug use: No    Sexual activity: Not Currently     Other Topics Concern    Not on file     Social History Narrative                ALLERGIES: Review of patient's allergies indicates no known allergies. Review of Systems   Constitutional: Negative for chills. HENT: Negative for rhinorrhea and sore throat. Eyes: Negative for redness.    Respiratory: Positive for cough and wheezing. Negative for shortness of breath. Cardiovascular: Positive for chest pain (on coughing ). Gastrointestinal: Negative for nausea and vomiting. Neurological: Negative for headaches. All other systems reviewed and are negative. Vitals:    12/03/17 1446   BP: 172/86   Pulse: 98   Resp: 18   Temp: 98.6 °F (37 °C)   SpO2: 94%   Weight: 140.2 kg (309 lb)   Height: 5' 9\" (1.753 m)       Physical Exam   Constitutional: No distress. HENT:   Right Ear: Tympanic membrane and ear canal normal.   Left Ear: Tympanic membrane and ear canal normal.   Nose: Nose normal.   Mouth/Throat: No oropharyngeal exudate, posterior oropharyngeal edema or posterior oropharyngeal erythema. Eyes: Conjunctivae are normal. Right eye exhibits no discharge. Left eye exhibits no discharge. Neck: Neck supple. Pulmonary/Chest: Effort normal. No respiratory distress. He has decreased breath sounds. He has no wheezes. He has no rhonchi. He has no rales. Lymphadenopathy:     He has no cervical adenopathy. Skin: No rash noted. Nursing note and vitals reviewed. MDM     Differential Diagnosis; Clinical Impression; Plan:     CLINICAL IMPRESSION:  Viral URI with cough  (primary encounter diagnosis)      DDX    Plan:    duoneb   Albuterol- prednisone- tessalon- robitussin AC  omeprazole  Amount and/or Complexity of Data Reviewed:    Review and summarize past medical records:  Yes  Risk of Significant Complications, Morbidity, and/or Mortality:   Presenting problems: Moderate  Management options:   Moderate  Progress:   Patient progress:  Stable      Procedures

## 2017-12-28 ENCOUNTER — HOSPITAL ENCOUNTER (EMERGENCY)
Age: 62
Discharge: HOME | End: 2017-12-28
Attending: FAMILY MEDICINE

## 2017-12-28 ENCOUNTER — APPOINTMENT (OUTPATIENT)
Dept: GENERAL RADIOLOGY | Age: 62
End: 2017-12-28
Attending: FAMILY MEDICINE

## 2017-12-28 VITALS
HEIGHT: 69 IN | TEMPERATURE: 97.8 F | OXYGEN SATURATION: 93 % | SYSTOLIC BLOOD PRESSURE: 162 MMHG | WEIGHT: 312 LBS | BODY MASS INDEX: 46.21 KG/M2 | HEART RATE: 80 BPM | DIASTOLIC BLOOD PRESSURE: 95 MMHG | RESPIRATION RATE: 16 BRPM

## 2017-12-28 DIAGNOSIS — R05.3 CHRONIC COUGH: Primary | ICD-10-CM

## 2017-12-28 DIAGNOSIS — D49.9 NEOPLASM CAUSING MASS EFFECT ON ADJACENT STRUCTURES: ICD-10-CM

## 2017-12-28 RX ORDER — BENZONATATE 200 MG/1
200 CAPSULE ORAL
Qty: 21 CAP | Refills: 0 | Status: SHIPPED | OUTPATIENT
Start: 2017-12-28 | End: 2018-01-04

## 2017-12-28 RX ORDER — PREDNISONE 5 MG/1
5 TABLET ORAL DAILY
Qty: 30 TAB | Refills: 0 | Status: SHIPPED | OUTPATIENT
Start: 2017-12-28

## 2017-12-28 RX ORDER — AZITHROMYCIN 250 MG/1
TABLET, FILM COATED ORAL
Qty: 6 TAB | Refills: 0 | Status: SHIPPED | OUTPATIENT
Start: 2017-12-28

## 2017-12-28 NOTE — UC PROVIDER NOTE
Patient is a 60 y.o. male presenting with cough. The history is provided by the patient. Cough   This is a chronic problem. The problem occurs constantly. Progression since onset: improved since last visit- treatment given did actually helped  as per patient and daughter  The cough is non-productive. Pertinent negatives include no chest pain, no chills, no shortness of breath, no wheezing, no nausea and no vomiting. Associated symptoms comments: Increases on exertion and shortness of breath on exertion . He has tried steroids, inhalers and prescription drugs (omeprazole/ steroids) for the symptoms. The treatment provided significant relief. He is not a smoker. His past medical history does not include asthma. Past medical history comments: h/o mediastinal mass- resection done- diagnosed begign in 2008 - had similar sxs before surgical removal .        Past Medical History:   Diagnosis Date    GERD (gastroesophageal reflux disease)     Hypercholesterolemia     Hypertension     Obesity         Past Surgical History:   Procedure Laterality Date    MD REPAIR PARAESOPHAGEAL HIATAL HERNIA,LAPAROTOMY, W MESH           Family History   Problem Relation Age of Onset    Hypertension Mother     Stroke Father     Heart Disease Maternal Grandmother     Stroke Maternal Grandfather     Heart Disease Paternal Grandmother         Social History     Social History    Marital status:      Spouse name: N/A    Number of children: N/A    Years of education: N/A     Occupational History    Not on file. Social History Main Topics    Smoking status: Former Smoker     Quit date: 1/25/1982    Smokeless tobacco: Never Used    Alcohol use 1.2 - 1.8 oz/week     2 - 3 Cans of beer per week    Drug use: No    Sexual activity: Not Currently     Other Topics Concern    Not on file     Social History Narrative                ALLERGIES: Review of patient's allergies indicates no known allergies.     Review of Systems Constitutional: Negative for chills. Respiratory: Positive for cough. Negative for shortness of breath and wheezing. Cardiovascular: Negative for chest pain. Gastrointestinal: Negative for nausea and vomiting. All other systems reviewed and are negative. Vitals:    12/28/17 1159   BP: (!) 162/95   Pulse: 80   Resp: 16   Temp: 97.8 °F (36.6 °C)   SpO2: 93%   Weight: 141.5 kg (312 lb)   Height: 5' 9\" (1.753 m)       Physical Exam   Constitutional: No distress. HENT:   Right Ear: Tympanic membrane and ear canal normal.   Left Ear: Tympanic membrane and ear canal normal.   Nose: Nose normal.   Mouth/Throat: No oropharyngeal exudate, posterior oropharyngeal edema or posterior oropharyngeal erythema. Eyes: Conjunctivae are normal. Right eye exhibits no discharge. Left eye exhibits no discharge. Neck: Neck supple. Pulmonary/Chest: Effort normal and breath sounds normal. No respiratory distress. He has no wheezes. He has no rales. Lymphadenopathy:     He has no cervical adenopathy. Skin: No rash noted. Nursing note and vitals reviewed. MDM     Differential Diagnosis; Clinical Impression; Plan:     CLINICAL IMPRESSION:  Chronic cough  (primary encounter diagnosis)  Neoplasm causing mass effect on adjacent structures      DDX    Plan:    CXr- mass effect on trachea due to mediastinal mass increase size  follow with pulmonary- need Ct of chest    Take prednisone 5 mg once a day as needed  zpak and tessalon if sxs worsen before seeing pulmonary   Amount and/or Complexity of Data Reviewed:    Review and summarize past medical records:  Yes  Risk of Significant Complications, Morbidity, and/or Mortality:   Presenting problems: Moderate  Management options:   Moderate  Progress:   Patient progress:  Stable      Procedures

## 2017-12-28 NOTE — DISCHARGE INSTRUCTIONS
Chronic Cough: Care Instructions  Your Care Instructions    A cough is your body's response to something that bothers your throat or airways. Many things can cause a cough. You might cough because of a cold or the flu, bronchitis, or asthma. Smoking, postnasal drip, allergies, and stomach acid that backs up into your throat also can cause a cough. A cough can be short-term (acute) or long-term (chronic). A chronic cough lasts more than 3 weeks. A chronic cough is often caused by a long-term problem, such as asthma. Another cause might be a medicine, such as an ACE inhibitor. A cough is a symptom, not a disease. To treat a chronic cough, you may need to treat the problem that causes it. You can take a few steps at home to cough less and feel better. Some people cough or clear their throat out of habit for no clear reason. Follow-up care is a key part of your treatment and safety. Be sure to make and go to all appointments, and call your doctor if you are having problems. It's also a good idea to know your test results and keep a list of the medicines you take. How can you care for yourself at home? · Drink plenty of water and other fluids. This may help soothe a dry or sore throat. Honey or lemon juice in hot water or tea may ease a dry cough. · Prop up your head on pillows to help you breathe and ease a cough. · Do not smoke or allow others to smoke around you. Smoke can make a cough worse. If you need help quitting, talk to your doctor about stop-smoking programs and medicines. These can increase your chances of quitting for good. · Avoid exposure to smoke, dust, or other pollutants, or wear a face mask. Check with your doctor or pharmacist to find out which type of face mask will give you the most benefit. · Take cough medicine as directed by your doctor. · Try cough drops to soothe a dry or sore throat. Cough drops don't stop a cough.  Medicine-flavored cough drops are no better than candy-flavored drops or hard candy. Throat clearing  When you have a chronic cough or a disease that may cause this type of cough, you may often feel like you want to clear your throat. This helps bring up mucus. But throat clearing does not always have a cause. Throat clearing can become a habit. The more you do it, the more you feel like you need to do it. But frequent throat clearing can be hard on your vocal cords. It's like slamming them together. To help lessen throat clearing, you can try:  · Taking small sips of water. · Not clearing your throat when you feel you need to. · Swallowing hard when you want to clear your throat. You may want to ask your doctor if a medicine that thins mucus would help. When should you call for help? Call 911 anytime you think you may need emergency care. For example, call if:  ? · You have severe trouble breathing. ?Call your doctor now or seek immediate medical care if:  ? · You cough up blood. ? · You have new or worse trouble breathing. ? · You have a new or higher fever. ? Watch closely for changes in your health, and be sure to contact your doctor if:  ? · You cough more deeply or more often, especially if you notice more mucus or a change in the color of your mucus. ? · You do not get better as expected. Where can you learn more? Go to http://luis-hua.info/. Enter X800 in the search box to learn more about \"Chronic Cough: Care Instructions. \"  Current as of: May 12, 2017  Content Version: 11.4  © 1989-0173 [employer]. Care instructions adapted under license by [employer] (which disclaims liability or warranty for this information). If you have questions about a medical condition or this instruction, always ask your healthcare professional. [address] any warranty or liability for your use of this information.

## 2018-02-05 DIAGNOSIS — E78.00 ELEVATED CHOLESTEROL: ICD-10-CM

## 2018-02-05 DIAGNOSIS — I10 ESSENTIAL HYPERTENSION: ICD-10-CM

## 2018-02-05 RX ORDER — AMLODIPINE BESYLATE 5 MG/1
5 TABLET ORAL DAILY
Qty: 30 TAB | Refills: 5 | Status: SHIPPED | OUTPATIENT
Start: 2018-02-05 | End: 2018-09-10 | Stop reason: SDUPTHER

## 2018-02-05 RX ORDER — ATORVASTATIN CALCIUM 10 MG/1
10 TABLET, FILM COATED ORAL
Qty: 30 TAB | Refills: 5 | Status: SHIPPED | OUTPATIENT
Start: 2018-02-05

## 2018-02-05 RX ORDER — LOSARTAN POTASSIUM AND HYDROCHLOROTHIAZIDE 25; 100 MG/1; MG/1
1 TABLET ORAL DAILY
Qty: 30 TAB | Refills: 5 | Status: SHIPPED | OUTPATIENT
Start: 2018-02-05 | End: 2018-09-10 | Stop reason: SDUPTHER

## 2018-02-05 RX ORDER — EZETIMIBE 10 MG/1
10 TABLET ORAL DAILY
Qty: 30 TAB | Refills: 5 | Status: SHIPPED | OUTPATIENT
Start: 2018-02-05

## 2018-02-05 NOTE — TELEPHONE ENCOUNTER
Last office visit- 8/8/17  Next office visit- 2/12/18  Last Refill- 8/8/17    Requested Prescriptions     Pending Prescriptions Disp Refills    ezetimibe (ZETIA) 10 mg tablet 30 Tab 5     Sig: Take 1 Tab by mouth daily.  losartan-hydroCHLOROthiazide (HYZAAR) 100-25 mg per tablet 30 Tab 5     Sig: Take 1 Tab by mouth daily. Indications: hypertension    amLODIPine (NORVASC) 5 mg tablet 30 Tab 5     Sig: Take 1 Tab by mouth daily. Indications: hypertension    atorvastatin (LIPITOR) 10 mg tablet 30 Tab 5     Sig: Take 1 Tab by mouth nightly.  Indications: hyperlipidemia

## 2018-02-08 ENCOUNTER — HOSPITAL ENCOUNTER (EMERGENCY)
Age: 63
Discharge: OTHER INSTITUTION | End: 2018-02-08
Attending: EMERGENCY MEDICINE
Payer: COMMERCIAL

## 2018-02-08 ENCOUNTER — APPOINTMENT (OUTPATIENT)
Dept: CT IMAGING | Age: 63
End: 2018-02-08
Attending: EMERGENCY MEDICINE
Payer: COMMERCIAL

## 2018-02-08 VITALS
OXYGEN SATURATION: 96 % | DIASTOLIC BLOOD PRESSURE: 95 MMHG | SYSTOLIC BLOOD PRESSURE: 141 MMHG | HEART RATE: 99 BPM | BODY MASS INDEX: 46.65 KG/M2 | TEMPERATURE: 97.8 F | RESPIRATION RATE: 19 BRPM | WEIGHT: 315 LBS | HEIGHT: 69 IN

## 2018-02-08 DIAGNOSIS — J96.01 ACUTE RESPIRATORY FAILURE WITH HYPOXIA (HCC): Primary | ICD-10-CM

## 2018-02-08 DIAGNOSIS — J98.59 MEDIASTINAL MASS: ICD-10-CM

## 2018-02-08 DIAGNOSIS — J18.9 PNEUMONIA OF RIGHT LUNG DUE TO INFECTIOUS ORGANISM, UNSPECIFIED PART OF LUNG: ICD-10-CM

## 2018-02-08 LAB
ALBUMIN SERPL-MCNC: 3.6 G/DL (ref 3.5–5)
ALBUMIN/GLOB SERPL: 0.9 {RATIO} (ref 1.1–2.2)
ALP SERPL-CCNC: 100 U/L (ref 45–117)
ALT SERPL-CCNC: 42 U/L (ref 12–78)
ANION GAP SERPL CALC-SCNC: 4 MMOL/L (ref 5–15)
AST SERPL-CCNC: 34 U/L (ref 15–37)
ATRIAL RATE: 103 BPM
BASOPHILS # BLD: 0 K/UL (ref 0–0.1)
BASOPHILS NFR BLD: 0 % (ref 0–1)
BILIRUB SERPL-MCNC: 0.7 MG/DL (ref 0.2–1)
BUN SERPL-MCNC: 13 MG/DL (ref 6–20)
BUN/CREAT SERPL: 17 (ref 12–20)
CALCIUM SERPL-MCNC: 8.6 MG/DL (ref 8.5–10.1)
CALCULATED P AXIS, ECG09: 49 DEGREES
CALCULATED R AXIS, ECG10: 6 DEGREES
CALCULATED T AXIS, ECG11: 44 DEGREES
CHLORIDE SERPL-SCNC: 104 MMOL/L (ref 97–108)
CK MB CFR SERPL CALC: 1.3 % (ref 0–2.5)
CK MB SERPL-MCNC: 2.8 NG/ML (ref 5–25)
CK SERPL-CCNC: 223 U/L (ref 39–308)
CO2 SERPL-SCNC: 32 MMOL/L (ref 21–32)
CREAT SERPL-MCNC: 0.76 MG/DL (ref 0.7–1.3)
DIAGNOSIS, 93000: NORMAL
DIFFERENTIAL METHOD BLD: ABNORMAL
EOSINOPHIL # BLD: 0 K/UL (ref 0–0.4)
EOSINOPHIL NFR BLD: 0 % (ref 0–7)
ERYTHROCYTE [DISTWIDTH] IN BLOOD BY AUTOMATED COUNT: 15.5 % (ref 11.5–14.5)
GLOBULIN SER CALC-MCNC: 3.8 G/DL (ref 2–4)
GLUCOSE SERPL-MCNC: 109 MG/DL (ref 65–100)
HCT VFR BLD AUTO: 44.4 % (ref 36.6–50.3)
HGB BLD-MCNC: 13.7 G/DL (ref 12.1–17)
IMM GRANULOCYTES # BLD: 0 K/UL (ref 0–0.04)
IMM GRANULOCYTES NFR BLD AUTO: 0 % (ref 0–0.5)
LYMPHOCYTES # BLD: 0.8 K/UL (ref 0.8–3.5)
LYMPHOCYTES NFR BLD: 6 % (ref 12–49)
MCH RBC QN AUTO: 26.6 PG (ref 26–34)
MCHC RBC AUTO-ENTMCNC: 30.9 G/DL (ref 30–36.5)
MCV RBC AUTO: 86 FL (ref 80–99)
MONOCYTES # BLD: 0.7 K/UL (ref 0–1)
MONOCYTES NFR BLD: 5 % (ref 5–13)
NEUTS SEG # BLD: 11.7 K/UL (ref 1.8–8)
NEUTS SEG NFR BLD: 89 % (ref 32–75)
NRBC # BLD: 0 K/UL (ref 0–0.01)
NRBC BLD-RTO: 0 PER 100 WBC
P-R INTERVAL, ECG05: 130 MS
PLATELET # BLD AUTO: 267 K/UL (ref 150–400)
PMV BLD AUTO: 10.2 FL (ref 8.9–12.9)
POTASSIUM SERPL-SCNC: 3.9 MMOL/L (ref 3.5–5.1)
PROT SERPL-MCNC: 7.4 G/DL (ref 6.4–8.2)
Q-T INTERVAL, ECG07: 356 MS
QRS DURATION, ECG06: 86 MS
QTC CALCULATION (BEZET), ECG08: 466 MS
RBC # BLD AUTO: 5.16 M/UL (ref 4.1–5.7)
RBC MORPH BLD: ABNORMAL
SODIUM SERPL-SCNC: 140 MMOL/L (ref 136–145)
TROPONIN I SERPL-MCNC: <0.04 NG/ML
VENTRICULAR RATE, ECG03: 103 BPM
WBC # BLD AUTO: 13.2 K/UL (ref 4.1–11.1)

## 2018-02-08 PROCEDURE — 74011000258 HC RX REV CODE- 258: Performed by: EMERGENCY MEDICINE

## 2018-02-08 PROCEDURE — 36415 COLL VENOUS BLD VENIPUNCTURE: CPT | Performed by: EMERGENCY MEDICINE

## 2018-02-08 PROCEDURE — 96367 TX/PROPH/DG ADDL SEQ IV INF: CPT

## 2018-02-08 PROCEDURE — 94640 AIRWAY INHALATION TREATMENT: CPT

## 2018-02-08 PROCEDURE — 84484 ASSAY OF TROPONIN QUANT: CPT | Performed by: EMERGENCY MEDICINE

## 2018-02-08 PROCEDURE — 71275 CT ANGIOGRAPHY CHEST: CPT

## 2018-02-08 PROCEDURE — 82550 ASSAY OF CK (CPK): CPT | Performed by: EMERGENCY MEDICINE

## 2018-02-08 PROCEDURE — 96365 THER/PROPH/DIAG IV INF INIT: CPT

## 2018-02-08 PROCEDURE — 96375 TX/PRO/DX INJ NEW DRUG ADDON: CPT

## 2018-02-08 PROCEDURE — 93005 ELECTROCARDIOGRAM TRACING: CPT

## 2018-02-08 PROCEDURE — 74011636320 HC RX REV CODE- 636/320: Performed by: EMERGENCY MEDICINE

## 2018-02-08 PROCEDURE — 74011250636 HC RX REV CODE- 250/636: Performed by: EMERGENCY MEDICINE

## 2018-02-08 PROCEDURE — 85025 COMPLETE CBC W/AUTO DIFF WBC: CPT | Performed by: EMERGENCY MEDICINE

## 2018-02-08 PROCEDURE — 77030029684 HC NEB SM VOL KT MONA -A

## 2018-02-08 PROCEDURE — 99285 EMERGENCY DEPT VISIT HI MDM: CPT

## 2018-02-08 PROCEDURE — 74011000250 HC RX REV CODE- 250: Performed by: EMERGENCY MEDICINE

## 2018-02-08 PROCEDURE — 80053 COMPREHEN METABOLIC PANEL: CPT | Performed by: EMERGENCY MEDICINE

## 2018-02-08 RX ORDER — SODIUM CHLORIDE 0.9 % (FLUSH) 0.9 %
10 SYRINGE (ML) INJECTION
Status: COMPLETED | OUTPATIENT
Start: 2018-02-08 | End: 2018-02-08

## 2018-02-08 RX ORDER — ONDANSETRON 2 MG/ML
4 INJECTION INTRAMUSCULAR; INTRAVENOUS
Status: COMPLETED | OUTPATIENT
Start: 2018-02-08 | End: 2018-02-08

## 2018-02-08 RX ORDER — FENTANYL CITRATE 50 UG/ML
100 INJECTION, SOLUTION INTRAMUSCULAR; INTRAVENOUS
Status: COMPLETED | OUTPATIENT
Start: 2018-02-08 | End: 2018-02-08

## 2018-02-08 RX ORDER — ALBUTEROL SULFATE 2.5 MG/.5ML
5 SOLUTION RESPIRATORY (INHALATION)
Status: COMPLETED | OUTPATIENT
Start: 2018-02-08 | End: 2018-02-08

## 2018-02-08 RX ORDER — SODIUM CHLORIDE 0.9 % (FLUSH) 0.9 %
5-10 SYRINGE (ML) INJECTION AS NEEDED
Status: DISCONTINUED | OUTPATIENT
Start: 2018-02-08 | End: 2018-02-08 | Stop reason: HOSPADM

## 2018-02-08 RX ORDER — SODIUM CHLORIDE 0.9 % (FLUSH) 0.9 %
5-10 SYRINGE (ML) INJECTION EVERY 8 HOURS
Status: DISCONTINUED | OUTPATIENT
Start: 2018-02-08 | End: 2018-02-08 | Stop reason: HOSPADM

## 2018-02-08 RX ORDER — SODIUM CHLORIDE 9 MG/ML
50 INJECTION, SOLUTION INTRAVENOUS
Status: COMPLETED | OUTPATIENT
Start: 2018-02-08 | End: 2018-02-08

## 2018-02-08 RX ADMIN — ONDANSETRON HYDROCHLORIDE 4 MG: 2 INJECTION, SOLUTION INTRAMUSCULAR; INTRAVENOUS at 13:14

## 2018-02-08 RX ADMIN — SODIUM CHLORIDE 1000 ML: 900 INJECTION, SOLUTION INTRAVENOUS at 09:38

## 2018-02-08 RX ADMIN — SODIUM CHLORIDE 50 ML/HR: 900 INJECTION, SOLUTION INTRAVENOUS at 10:40

## 2018-02-08 RX ADMIN — ALBUTEROL SULFATE 5 MG: 2.5 SOLUTION RESPIRATORY (INHALATION) at 10:50

## 2018-02-08 RX ADMIN — Medication 20 ML: at 10:40

## 2018-02-08 RX ADMIN — FENTANYL CITRATE 100 MCG: 50 INJECTION, SOLUTION INTRAMUSCULAR; INTRAVENOUS at 13:16

## 2018-02-08 RX ADMIN — Medication 10 ML: at 09:40

## 2018-02-08 RX ADMIN — AZITHROMYCIN 500 MG: 500 INJECTION, POWDER, LYOPHILIZED, FOR SOLUTION INTRAVENOUS at 14:26

## 2018-02-08 RX ADMIN — IOPAMIDOL 160 ML: 755 INJECTION, SOLUTION INTRAVENOUS at 10:39

## 2018-02-08 RX ADMIN — CEFTRIAXONE 1 G: 1 INJECTION, POWDER, FOR SOLUTION INTRAMUSCULAR; INTRAVENOUS at 13:22

## 2018-02-08 NOTE — PROGRESS NOTES
Pt is a 59 y/o [facility] American male who presented to the ED via private vehicle with cc of constant, worsening SOB with associated productive cough of yellow/clear sputum since December 2017. CM met with pt re: verifying demographic information. CM introduced self, role. Pt and pt's daughter at bedside José Tamez [phone]) verbalized understanding. Pt verified demographic information on chart. Pt and daughter verified pt has [employer] which was purchased from [employer] and is currently active. CM offered information re: [facility]. CM educated pt and family that [facility] accept [employer] for their inpatient needs. Pt and family verbalized understanding and stated that if the pt needs admission they are agreeable to transferring to a [facility]. Pt would like to transfer to [facility] if admission is needed. CM notified Attending Juan Foster of information. Pt and daughter stated they have no further questions or needs at this time. CM to continue to offer support, discharge planning as needed. Care Management Interventions  PCP Verified by CM: Yes  Last Visit to PCP: 08/08/18  Mode of Transport at Discharge:  Other (see comment) (Pt family available to transport at discharge)  Transition of Care Consult (CM Consult): Discharge Planning  Discharge Durable Medical Equipment: No  Physical Therapy Consult: No  Occupational Therapy Consult: No  Speech Therapy Consult: No  Current Support Network: Own Home  Confirm Follow Up Transport: Family  Plan discussed with Pt/Family/Caregiver: Yes  Discharge Location  Discharge Placement:  (TBD)    OSCAR Brooks  [address]  [phone]

## 2018-02-08 NOTE — ED TRIAGE NOTES
Assumed care of pt via wheelchair from triage with family. Pt reports CC of SOB ongoing since December that has gotten worse this morning. Pt denies N/V/D and dizziness. Pt placed on 3L via NC as pt's SpO2 was 88% on room air. Pt resting on stretcher on monitor x 3.

## 2018-02-08 NOTE — ED NOTES
Per MD, took pt off O2. Pt desated to 87% on multiple occasions prior to getting up. Not comfortable ambulating pt with out O2 at this time. Placed pt back on 2L via NC. Medicated pt with pain medication. Pt dropped back to 89%90%  Increased O2 to 4L via NC. Pt resting on stretcher in POC.

## 2018-02-08 NOTE — ED PROVIDER NOTES
EMERGENCY DEPARTMENT HISTORY AND PHYSICAL EXAM      Date: 2/8/2018  Patient Name: Chong Michelle Sr.    History of Presenting Illness     Chief Complaint   Patient presents with    Cough     ongoing since dec    Shortness of Breath     ongoing but worse this am, recently dx mass esophageal, +SCHULTZ    Rib Pain       History Provided By: Patient    HPI: Chong Michelle Sr., 60 y.o. male with PMHx significant for HTN, hypercholesterolemia, and GERD, presents ambulatory to the ED with cc of constant, worsening SOB with associated productive cough of yellow/clear sputum today. Pt states that he's had a cough and mild SOB since December 2017. Per daughter, pt has been seen and treated twice from the [facility] regarding symptoms with no relief despite use of Albuterol inhalers, Prednisone, and a Z-Rex. She notes that a CXR during a prior visit showed a mass, however, she states the pt's insurance ended at the end of the month for which he has not been able to follow up; chart review shows the impression as \"Mass effect upon the trachea is increased concerning for an increase in the size of the known mediastinal masses. \" Pt reports additional symptom of chest wall pain which is worsened upon coughing. He denies tobacco use. He consumes alcohol on a weekend basis. He denies use of supplemental oxygen at home. He denies any CP, N/V/D, or fever. PCP: Samara Kumar NP    There are no other complaints, changes, or physical findings at this time.     Current Facility-Administered Medications   Medication Dose Route Frequency Provider Last Rate Last Dose    sodium chloride (NS) flush 5-10 mL  5-10 mL IntraVENous Q8H Heath Parada, DO   10 mL at 02/08/18 0940    sodium chloride (NS) flush 5-10 mL  5-10 mL IntraVENous PRN Heath Hicksins, DO        iopamidol (ISOVUE-370) 76 % injection             azithromycin (ZITHROMAX) 500 mg in 0.9% sodium chloride 250 mL IVPB  500 mg IntraVENous NOW Heath Parada DO Current Outpatient Prescriptions   Medication Sig Dispense Refill    ezetimibe (ZETIA) 10 mg tablet Take 1 Tab by mouth daily. 30 Tab 5    losartan-hydroCHLOROthiazide (HYZAAR) 100-25 mg per tablet Take 1 Tab by mouth daily. Indications: hypertension 30 Tab 5    amLODIPine (NORVASC) 5 mg tablet Take 1 Tab by mouth daily. Indications: hypertension 30 Tab 5    atorvastatin (LIPITOR) 10 mg tablet Take 1 Tab by mouth nightly. Indications: hyperlipidemia 30 Tab 5    azithromycin (ZITHROMAX) 250 mg tablet Take two tablets today then one tablet daily 6 Tab 0    predniSONE (DELTASONE) 5 mg tablet Take 1 Tab by mouth daily. 30 Tab 0    predniSONE (STERAPRED DS) 10 mg dose pack As directed 21 Tab 0    omeprazole (PRILOSEC) 40 mg capsule Take 1 Cap by mouth daily. 15 Cap 0    albuterol (PROVENTIL HFA, VENTOLIN HFA, PROAIR HFA) 90 mcg/actuation inhaler Take 2 Puffs by inhalation every six (6) hours as needed for Wheezing. 1 Inhaler 0    multivitamin (ONE A DAY) tablet Take 1 tablet by mouth daily.  cetirizine (ZYRTEC) 10 mg tablet Take 10 mg by mouth daily.  aspirin delayed-release 81 mg tablet Take 81 mg by mouth daily.          Past History     Past Medical History:  Past Medical History:   Diagnosis Date    GERD (gastroesophageal reflux disease)     Hypercholesterolemia     Hypertension     Obesity        Past Surgical History:  Past Surgical History:   Procedure Laterality Date    UT REPAIR PARAESOPHAGEAL HIATAL HERNIA,LAPAROTOMY, W MESH         Family History:  Family History   Problem Relation Age of Onset    Hypertension Mother     Stroke Father     Heart Disease Maternal Grandmother     Stroke Maternal Grandfather     Heart Disease Paternal Grandmother        Social History:  Social History   Substance Use Topics    Smoking status: Former Smoker     Quit date: 1/25/1982    Smokeless tobacco: Never Used    Alcohol use 1.2 - 1.8 oz/week     2 - 3 Cans of beer per week      Comment: social       Allergies:  No Known Allergies      Review of Systems   Review of Systems   Constitutional: Negative. Negative for appetite change, chills, fatigue and fever. HENT: Negative. Negative for congestion, rhinorrhea, sinus pressure and sore throat. Eyes: Negative. Respiratory: Positive for cough and shortness of breath. Negative for choking, chest tightness and wheezing. Cardiovascular: Negative. Negative for chest pain, palpitations and leg swelling. Gastrointestinal: Negative for abdominal pain, constipation, diarrhea, nausea and vomiting. Endocrine: Negative. Genitourinary: Negative. Negative for difficulty urinating, dysuria, flank pain and urgency. Musculoskeletal:        Positive for chest wall pain with coughing. Skin: Negative. Neurological: Negative. Negative for dizziness, speech difficulty, weakness, light-headedness, numbness and headaches. Psychiatric/Behavioral: Negative. All other systems reviewed and are negative. Physical Exam   Physical Exam   Constitutional: He is oriented to person, place, and time. He appears well-developed and well-nourished. Distressed: mod resp distress. HENT:   Head: Normocephalic and atraumatic. Mouth/Throat: Oropharynx is clear and moist. No oropharyngeal exudate. Eyes: Conjunctivae and EOM are normal. Pupils are equal, round, and reactive to light. Neck: Normal range of motion. Neck supple. No JVD present. No tracheal deviation present. Cardiovascular: Regular rhythm, normal heart sounds and intact distal pulses. No murmur heard. Tachycardic     Pulmonary/Chest: No stridor. He has wheezes. He has no rales. He exhibits no tenderness. Increase resp effort, diminished in Right lung fields, upper > lower     Abdominal: Soft. He exhibits no distension. There is no tenderness. There is no rebound and no guarding. Morbidly obese     Musculoskeletal: Normal range of motion. He exhibits no edema or tenderness. Neurological: He is alert and oriented to person, place, and time. No cranial nerve deficit. No gross motor or sensory deficits    Skin: Skin is warm and dry. He is not diaphoretic. Psychiatric: He has a normal mood and affect. His behavior is normal.   Nursing note and vitals reviewed. Diagnostic Study Results     Labs -  Recent Results (from the past 12 hour(s))   EKG, 12 LEAD, INITIAL    Collection Time: 02/08/18  8:41 AM   Result Value Ref Range    Ventricular Rate 103 BPM    Atrial Rate 103 BPM    P-R Interval 130 ms    QRS Duration 86 ms    Q-T Interval 356 ms    QTC Calculation (Bezet) 466 ms    Calculated P Axis 49 degrees    Calculated R Axis 6 degrees    Calculated T Axis 44 degrees    Diagnosis       Sinus tachycardia  Possible Left atrial enlargement  When compared with ECG of 21-FEB-2008 09:49,  No significant change was found  Confirmed by Adonay Mendez PPAKO ([id number]) on 2/8/2018 11:00:16 AM     CBC WITH AUTOMATED DIFF    Collection Time: 02/08/18  9:35 AM   Result Value Ref Range    WBC 13.2 (H) 4.1 - 11.1 K/uL    RBC 5.16 4.10 - 5.70 M/uL    HGB 13.7 12.1 - 17.0 g/dL    HCT 44.4 36.6 - 50.3 %    MCV 86.0 80.0 - 99.0 FL    MCH 26.6 26.0 - 34.0 PG    MCHC 30.9 30.0 - 36.5 g/dL    RDW 15.5 (H) 11.5 - 14.5 %    PLATELET 643 616 - 009 K/uL    MPV 10.2 8.9 - 12.9 FL    NRBC 0.0 0  WBC    ABSOLUTE NRBC 0.00 0.00 - 0.01 K/uL    NEUTROPHILS 89 (H) 32 - 75 %    LYMPHOCYTES 6 (L) 12 - 49 %    MONOCYTES 5 5 - 13 %    EOSINOPHILS 0 0 - 7 %    BASOPHILS 0 0 - 1 %    IMMATURE GRANULOCYTES 0 0.0 - 0.5 %    ABS. NEUTROPHILS 11.7 (H) 1.8 - 8.0 K/UL    ABS. LYMPHOCYTES 0.8 0.8 - 3.5 K/UL    ABS. MONOCYTES 0.7 0.0 - 1.0 K/UL    ABS. EOSINOPHILS 0.0 0.0 - 0.4 K/UL    ABS. BASOPHILS 0.0 0.0 - 0.1 K/UL    ABS. IMM.  GRANS. 0.0 0.00 - 0.04 K/UL    DF AUTOMATED      RBC COMMENTS NORMOCYTIC, NORMOCHROMIC     METABOLIC PANEL, COMPREHENSIVE    Collection Time: 02/08/18  9:35 AM   Result Value Ref Range    Sodium 140 136 - 145 mmol/L    Potassium 3.9 3.5 - 5.1 mmol/L    Chloride 104 97 - 108 mmol/L    CO2 32 21 - 32 mmol/L    Anion gap 4 (L) 5 - 15 mmol/L    Glucose 109 (H) 65 - 100 mg/dL    BUN 13 6 - 20 MG/DL    Creatinine 0.76 0.70 - 1.30 MG/DL    BUN/Creatinine ratio 17 12 - 20      GFR est AA >60 >60 ml/min/1.73m2    GFR est non-AA >60 >60 ml/min/1.73m2    Calcium 8.6 8.5 - 10.1 MG/DL    Bilirubin, total 0.7 0.2 - 1.0 MG/DL    ALT (SGPT) 42 12 - 78 U/L    AST (SGOT) 34 15 - 37 U/L    Alk. phosphatase 100 45 - 117 U/L    Protein, total 7.4 6.4 - 8.2 g/dL    Albumin 3.6 3.5 - 5.0 g/dL    Globulin 3.8 2.0 - 4.0 g/dL    A-G Ratio 0.9 (L) 1.1 - 2.2     CK W/ CKMB & INDEX    Collection Time: 02/08/18  9:35 AM   Result Value Ref Range     39 - 308 U/L    CK - MB 2.8 <3.6 NG/ML    CK-MB Index 1.3 0 - 2.5     TROPONIN I    Collection Time: 02/08/18  9:35 AM   Result Value Ref Range    Troponin-I, Qt. <0.04 <0.05 ng/mL       Radiologic Studies -   CT Results  (Last 48 hours)               02/08/18 1039  CTA CHEST W OR W WO CONT Final result    Impression:  IMPRESSION:   No evidence of acute pulmonary embolus. Diffuse airspace disease throughout the   right lung likely represents pneumonia; follow-up is recommended to assure   complete resolution. Dominant mediastinal mass is increased in size when   compared to the 2006 examination; the smaller mediastinal mass appears stable. Dilated, fluid-filled esophagus, suggestive of esophageal dysmotility or   gastroesophageal reflux. Narrative:  INDICATION: Acute chest pain. Chest mass. Worsening dyspnea. COMPARISON: December 8, 2006       TECHNIQUE:     Routine noncontrast imaging the chest was performed for localization purposes. Then, following the uneventful intravenous administration of 160 cc [id number]-885,   thin helical axial images were obtained through the chest. 3D image   postprocessing was performed.  CT dose reduction was achieved through use of a standardized protocol tailored for this examination and automatic exposure   control for dose modulation. FINDINGS:       THYROID: No nodule. MEDIASTINUM: 2.9 x 2.3 cm left superior mediastinal mass is not significantly   changed when compared to the 2006 examination. 6.2 x 4.9 cm mediastinal mass   posterior to the trachea previously measured 5.6 x 4.2 cm. IQRA: No mass or lymphadenopathy. THORACIC AORTA: No dissection or aneurysm. PULMONARY ARTERIES: Main pulmonary artery is normal in caliber. No evidence of   acute pulmonary emboli. TRACHEA/BRONCHI: Patent. ESOPHAGUS: Dilated and fluid-filled. HEART: Normal in size. PLEURA: No effusion or pneumothorax. LUNGS: Patchy airspace disease throughout the right lung. INCIDENTALLY IMAGED UPPER ABDOMEN: No focal abnormality. BONES: No destructive bone lesion. Post thoracotomy changes are present in the   right posterior chest wall. ADDITIONAL COMMENTS: N/A                 Medical Decision Making   I am the first provider for this patient. I reviewed the vital signs, available nursing notes, past medical history, past surgical history, family history and social history. Vital Signs-Reviewed the patient's vital signs.   Patient Vitals for the past 12 hrs:   Temp Pulse Resp BP SpO2   02/08/18 1327 - - - - 94 %   02/08/18 1326 - 98 20 - (!) 83 %   02/08/18 1324 - 100 18 - (!) 82 %   02/08/18 1321 - 100 17 - (!) 86 %   02/08/18 1318 - (!) 102 30 - (!) 87 %   02/08/18 1300 - 98 27 148/90 93 %   02/08/18 1050 - 94 - - -   02/08/18 0945 - (!) 103 26 164/89 96 %   02/08/18 0840 - 99 15 - 98 %   02/08/18 0821 97.8 °F (36.6 °C) 100 26 146/77 (!) 88 %       Pulse Oximetry Analysis - 97% on 3L NC    Cardiac Monitor:   Rate: 101 bpm  Rhythm: Sinus Tachycardia      Records Reviewed: Nursing Notes, Old Medical Records and Previous Laboratory Studies    Provider Notes (Medical Decision Making):   DDx: Post obstructive pneumonia, obstructive mass, asthma, acute respiratory failure     ED Course:   Initial assessment performed. The patients presenting problems have been discussed, and they are in agreement with the care plan formulated and outlined with them. I have encouraged them to ask questions as they arise throughout their visit. EKG 0841- Sinus tach, rate 103, normal pr/qrs, axis, no acute ST- T wave changes, Aissatou Hawkins DO    Wheezing has improved and resolved, CXR mediastinal mass, CT ordered no PE, mediastinal mass with right sided pneumonia, ordered IV Rocephin/Azithromycin. Oxygen turned off, with minimal movement in bed Pt O2 sats dropped to 83%, pt oxygen turned back to 4L by nasal cannula. Pt will need to be admitted, pt has Tashi will need to be transferred to [facility].          1:22 PM  Discussed results of labs and imaging with pt as well as need for admission. Because he is a [facility] patient, he will need admission to an [facility] facility. Pt states he'd like to be transferred to [facility]. Will call the transfer center to begin process. CONSULT NOTE:  1:45 PM  Roena Opitz, DO spoke with Dr. Miguel Parks,  Specialty: Emergency [address]  Discussed pt's hx, disposition, and available diagnostic and imaging results. Reviewed care plans. Consultant agrees to accept pt for transfer. He is requesting a copy of the pt's imaging report on a CD.     1:53 PM  Discussed plan with pt and daughter at bedside. They are in agreement with plan for transfer at this time.      CRITICAL CARE NOTE:  IMPENDING DETERIORATION -Respiratory  ASSOCIATED RISK FACTORS - Hypoxia  MANAGEMENT- Bedside Assessment and Supervision of Care  INTERPRETATION -  CT Scan, ECG, Blood Pressure and Cardiac Output Measures   INTERVENTIONS - Oxygen, Nebulizer treatment, Antibiotics  CASE REVIEW - Nursing, Family and ED physician at [address] [place] [address] -Improved  PERFORMED BY - Self  NOTES:  I have spent 45 minutes of critical care time involved in lab review, consultations with specialist, family decision- making, bedside attention and documentation. During this entire length of time I was immediately available to the patient. Rose Odonnell DO    Disposition:  Transfer Note:  Patient is being transferred to the ED at [facility], transfer accepted by Dr. Rosita Alcantara. The reasons for patient's transfer have been discussed with the patient and available family. They convey agreement and understanding for the need to be transferred as explained to them by Rose Odonnell DO. PLAN:  1. Transfer to [facility] ED    Diagnosis     Clinical Impression:   1. Acute respiratory failure with hypoxia ([address]    2. Pneumonia of right lung due to infectious organism, unspecified part of lung    3. Mediastinal mass        Attestations: This note is prepared by Andrea Nathan, acting as Scribe for Rose Odonnell, [facility], DO: The scribe's documentation has been prepared under my direction and personally reviewed by me in its entirety. I confirm that the note above accurately reflects all work, treatment, procedures, and medical decision making performed by me.

## 2018-09-10 DIAGNOSIS — I10 ESSENTIAL HYPERTENSION: ICD-10-CM

## 2018-09-10 RX ORDER — LOSARTAN POTASSIUM AND HYDROCHLOROTHIAZIDE 25; 100 MG/1; MG/1
1 TABLET ORAL DAILY
Qty: 30 TAB | Refills: 0 | Status: SHIPPED | OUTPATIENT
Start: 2018-09-10 | End: 2018-10-04 | Stop reason: SDUPTHER

## 2018-09-10 RX ORDER — AMLODIPINE BESYLATE 5 MG/1
5 TABLET ORAL DAILY
Qty: 30 TAB | Refills: 0 | Status: SHIPPED | OUTPATIENT
Start: 2018-09-10 | End: 2018-10-04 | Stop reason: SDUPTHER

## 2018-09-10 NOTE — TELEPHONE ENCOUNTER
Requested Prescriptions     Pending Prescriptions Disp Refills    amLODIPine (NORVASC) 5 mg tablet 30 Tab 5     Sig: Take 1 Tab by mouth daily. Indications: hypertension    losartan-hydroCHLOROthiazide (HYZAAR) 100-25 mg per tablet 30 Tab 5     Sig: Take 1 Tab by mouth daily.  Indications: hypertension     08/08/17  No upcoming appt           Pharmacy: [id number]

## 2018-09-28 ENCOUNTER — TELEPHONE (OUTPATIENT)
Dept: INTERNAL MEDICINE CLINIC | Age: 63
End: 2018-09-28

## 2018-09-28 NOTE — TELEPHONE ENCOUNTER
Received Rx refill requests from [address] for: Losartan-HCTZ, Famotidine, Amlodipine. Patient last seen 8/2017, w/ instructions: Return in about 6 months (around 2/8/2018) for HTN and CPE. No appointment scheduled, has not been seen. Attempt to contact patient at [phone] number listed in chart, automated message that number is no longer in service. Spoke with patient at mobile number listed, two identifiers verified. Advised we received refill requests, but needs to schedule since he is overdue x7mos for visit. Once he schedules, he will receive refills. Patient verbalizes understanding, declines scheduling at this time. Advised he must return call & schedule visit in order to get refills. Patient verbalizes understanding and agrees. If patient returns call, please schedule for visit & inform nurse.

## 2018-10-04 DIAGNOSIS — I10 ESSENTIAL HYPERTENSION: ICD-10-CM

## 2018-10-04 NOTE — TELEPHONE ENCOUNTER
Requested Prescriptions     Pending Prescriptions Disp Refills    amLODIPine (NORVASC) 5 mg tablet 30 Tab 0     Sig: Take 1 Tab by mouth daily. Needs office visit for further refills  Indications: hypertension    losartan-hydroCHLOROthiazide (HYZAAR) 100-25 mg per tablet 30 Tab 0     Sig: Take 1 Tab by mouth daily.  Needs office visit for future refills  Indications: hypertension     [facility]    08/08/2017  No upcoming  This was called in by the pharmacy

## 2018-10-04 NOTE — TELEPHONE ENCOUNTER
Message routed to nurse for refills, no appointment scheduled, overdue. Was seen 8/2017 w/ instructions: Return in about 6 months (around 2/8/2018) for HTN and CPE. Spoke w/ patient 9/28/18 (please see telephone encounter) regarding this, no visit. Attempt to contact patient at number listed in chart, no answer. Left voicemail for patient to return call to schedule visit for refills.

## 2018-10-08 RX ORDER — LOSARTAN POTASSIUM AND HYDROCHLOROTHIAZIDE 25; 100 MG/1; MG/1
1 TABLET ORAL DAILY
Qty: 15 TAB | Refills: 0 | Status: SHIPPED | OUTPATIENT
Start: 2018-10-08

## 2018-10-08 RX ORDER — AMLODIPINE BESYLATE 5 MG/1
5 TABLET ORAL DAILY
Qty: 14 TAB | Refills: 0 | Status: SHIPPED | OUTPATIENT
Start: 2018-10-08

## 2018-10-23 NOTE — TELEPHONE ENCOUNTER
Patient has been informed of need for visit, please see recent telephone encounter & refill, was given x2wk supply of requested Rx with note to schedule visit. Patient has not scheduled. Rx denied & faxed back to pharmacy with note patient must be seen. Attempt to contact patient at mobile number listed in chart, personal number, no answer. Left voicemail for patient to return call to schedule overdue visit, and no refills would be sent until he is scheduled. If patient returns call, please schedule for follow up visit.

## 2018-11-19 NOTE — TELEPHONE ENCOUNTER
Called, left vm for pt to return call to office. Called and left message on mobile phone r/t need f/u appt asp before meds can be refilled. Pharmacy faxed r/t patient need appt shonna. Refill denied.

## 2018-11-28 ENCOUNTER — TELEPHONE (OUTPATIENT)
Dept: INTERNAL MEDICINE CLINIC | Age: 63
End: 2018-11-28

## 2018-11-28 DIAGNOSIS — I10 ESSENTIAL HYPERTENSION: ICD-10-CM

## 2018-11-28 RX ORDER — LOSARTAN POTASSIUM AND HYDROCHLOROTHIAZIDE 25; 100 MG/1; MG/1
1 TABLET ORAL DAILY
Qty: 15 TAB | Refills: 0 | Status: CANCELLED | OUTPATIENT
Start: 2018-11-28

## 2018-11-28 RX ORDER — AMLODIPINE BESYLATE 5 MG/1
5 TABLET ORAL DAILY
Qty: 14 TAB | Refills: 0 | Status: CANCELLED | OUTPATIENT
Start: 2018-11-28

## 2018-11-28 NOTE — TELEPHONE ENCOUNTER
Patient is overdue for office visit and has been informed multiple times he needs to schedule in order to be seen.

## 2018-11-29 NOTE — TELEPHONE ENCOUNTER
Call to [facility]. Spoke with Sarah Bangura and advised our office would not be approving further refills until this pt schedules an appointment and is seen within the office.

## 2018-11-29 NOTE — TELEPHONE ENCOUNTER
Pt last seen in office 08.08.17 with instructions to return in about 6 months (around 02.08.18) for HTN and CPE. Pt has not complied with this request and does not have an office visit scheduled at this time. Per documentation from 09.28.18,  was advised via phone, by clinical staff, of the need for an appointment and he declined to schedule. On 10.04.18, additional refill request were received by the pharmacy. Several unsuccessful attempts were made to reach the pt and VM's requesting him to contact the office back were made.